# Patient Record
Sex: FEMALE | Employment: FULL TIME | URBAN - METROPOLITAN AREA
[De-identification: names, ages, dates, MRNs, and addresses within clinical notes are randomized per-mention and may not be internally consistent; named-entity substitution may affect disease eponyms.]

---

## 2024-01-06 ENCOUNTER — APPOINTMENT (OUTPATIENT)
Dept: DERMATOLOGY | Facility: CLINIC | Age: 54
End: 2024-01-06
Payer: COMMERCIAL

## 2024-01-12 ENCOUNTER — ANCILLARY PROCEDURE (OUTPATIENT)
Dept: RADIOLOGY | Facility: CLINIC | Age: 54
End: 2024-01-12
Payer: COMMERCIAL

## 2024-01-12 ENCOUNTER — OFFICE VISIT (OUTPATIENT)
Dept: ORTHOPEDIC SURGERY | Facility: CLINIC | Age: 54
End: 2024-01-12
Payer: COMMERCIAL

## 2024-01-12 ENCOUNTER — APPOINTMENT (OUTPATIENT)
Dept: RADIOLOGY | Facility: CLINIC | Age: 54
End: 2024-01-12
Payer: COMMERCIAL

## 2024-01-12 DIAGNOSIS — M25.561 RIGHT KNEE PAIN, UNSPECIFIED CHRONICITY: ICD-10-CM

## 2024-01-12 DIAGNOSIS — M25.551 RIGHT HIP PAIN: ICD-10-CM

## 2024-01-12 PROCEDURE — 73502 X-RAY EXAM HIP UNI 2-3 VIEWS: CPT | Mod: RT

## 2024-01-12 PROCEDURE — 73502 X-RAY EXAM HIP UNI 2-3 VIEWS: CPT | Mod: RIGHT SIDE | Performed by: RADIOLOGY

## 2024-01-20 ENCOUNTER — APPOINTMENT (OUTPATIENT)
Dept: DERMATOLOGY | Facility: CLINIC | Age: 54
End: 2024-01-20
Payer: COMMERCIAL

## 2024-02-13 ENCOUNTER — OFFICE VISIT (OUTPATIENT)
Dept: ORTHOPEDIC SURGERY | Facility: CLINIC | Age: 54
End: 2024-02-13
Payer: COMMERCIAL

## 2024-02-13 DIAGNOSIS — M25.561 RIGHT KNEE PAIN, UNSPECIFIED CHRONICITY: ICD-10-CM

## 2024-02-13 DIAGNOSIS — M16.0 PRIMARY OSTEOARTHRITIS OF BOTH HIPS: Primary | ICD-10-CM

## 2024-02-13 DIAGNOSIS — M25.551 RIGHT HIP PAIN: ICD-10-CM

## 2024-02-13 DIAGNOSIS — M17.0 PRIMARY OSTEOARTHRITIS OF BOTH KNEES: ICD-10-CM

## 2024-02-13 PROCEDURE — 99213 OFFICE O/P EST LOW 20 MIN: CPT | Performed by: FAMILY MEDICINE

## 2024-02-13 PROCEDURE — 1036F TOBACCO NON-USER: CPT | Performed by: FAMILY MEDICINE

## 2024-02-13 RX ORDER — FLUTICASONE PROPIONATE 50 MCG
1 SPRAY, SUSPENSION (ML) NASAL DAILY
COMMUNITY

## 2024-02-13 RX ORDER — KETOTIFEN FUMARATE 0.35 MG/ML
1 SOLUTION/ DROPS OPHTHALMIC 2 TIMES DAILY
COMMUNITY

## 2024-02-13 RX ORDER — LORATADINE 10 MG/1
5 TABLET ORAL DAILY
COMMUNITY

## 2024-02-13 ASSESSMENT — PAIN SCALES - GENERAL: PAINLEVEL_OUTOF10: 9

## 2024-02-13 ASSESSMENT — PAIN DESCRIPTION - DESCRIPTORS: DESCRIPTORS: ACHING;SHARP

## 2024-02-13 ASSESSMENT — PAIN - FUNCTIONAL ASSESSMENT: PAIN_FUNCTIONAL_ASSESSMENT: 0-10

## 2024-02-13 NOTE — PROGRESS NOTES
Vincenzo pt R hip pain    History of Present Illness:  Encounter date: 02/13/2024  Jodie Boateng is a 53 y.o. female who presents today for evaluation of chronic right hip and right knee pain. She has history of DJD in both knees and right hip. She previously followed with [facility] for management of these issues, has had cortisone injections in her knees and right hip in the past, s/p right hip cortisone injection 10/2023 which did provide relief. She states her pain has significantly improved over the last several months with cutting out alcohol and dairy, stating she does not feel she needs any intervention today, but wanted to establish care for further orthopedic management if necessary, no longer able to go to [facility] due to insurance change. She takes ibuprofen or naproxen as needed which is infrequent. She has done PT in the past, but not currently following with PT. She denies any new injury/trauma to knees or right hip. Pain is primarily in right groin when it flares up and states she has started to have occasional similar mild pain in left hip. Denies radiation of pain, numbness, tingling, weakness, swelling, redness or warmth. Denies other concerns.    Review of Systems  Constitutional: no fever, no chills, not feeling tired, no recent weight gain and no recent weight loss.   ENT: no nosebleeds.   Cardiovascular: no chest pain.   Respiratory: no shortness of breath and no cough.   Gastrointestinal: no abdominal pain, no nausea, no diarrhea and no vomiting.   Musculoskeletal: as noted in HPI and no arthralgias.   Integumentary: no rashes and no skin wound.   Neurological: no headache.   Psychiatric: no sleep disturbances and no depression.   Endocrine: no muscle weakness and no muscle cramps.   Hematologic/Lymphatic: no swollen glands and no tendency for easy bruising.     Physical Exam:  The Right hip and pelvis are without obvious signs of acute bony deformity or instability. Active and passive  range of motion are slightly limited in internal rotation and slightly painful in internal rotation, otherwise full and pain free. Log roll is negative. Straight leg raise test is negative. Ced is negative. Crossover is negative. Hip strength is weak as compared to the opposite hip. The opposite hip is otherwise nontender and stable. Gait is non-antalgic and tandem.     The RIGHT knee is without joint effusion. Patella crepitus and grind are positive. There is minimal tenderness to the medial and lateral joint lines. Flexion and extension are without mechanical blocking. There is no instability with stress testing.  The LEFT knee is without joint effusion. Patella crepitus and grind are positive. There is minimal tenderness to the medial and lateral joint lines. Flexion and extension are without mechanical blocking. There is no instability with stress testing.  Skin - no rashes, sores, or open lesions. Strength, sensory and vascular exams are otherwise normal. There is no clubbing, cyanosis or edema.    Constitutional: General appearance = Alert and in no acute distress. Well developed, well nourished.   Head and face: Normal.    Eyes: External Eye, Conjunctiva and Lids=Normal external exam; extraocular movements intact (EOMI).   Ears, Nose, Mouth, and Throat: External inspection of ears and nose=Normal.   Hearing= Normal.    Neck: No neck mass was observed. Supple.   Pulmonary: Respiratory effort = No respiratory distress.   Cardiovascular: Examination of extremities= No peripheral edema.   Skin and subcutaneous tissue: Normal skin color and pigmentation, normal skin turgor, and no rash; palpation of skin and subcutaneous tissue=Normal.    Neurologic: Sensation= Normal.    Psychiatric: Judgment and insight= Intact.  Orientation to person, place, and time: Alert and oriented x 3.  Mood and affect= Normal.     Imaging:  === 01/12/24 ===    XR HIP RIGHT WITH PELVIS WHEN PERFORMED 2 OR 3 VIEWS    - Impression  -  Moderate right hip osteoarthritis      MACRO:  None    Signed by: Rich Lee 1/13/2024 9:40 AM  Dictation workstation:   [id number]     Radiographs of the right hip obtained from outside source on 1/12/2024 were reviewed and revealed moderate right hip DJD, mild left hip DJD.  The studies were reviewed with Dr. Galan personally in the office today.    Problem List Items Addressed This Visit    None  Visit Diagnoses         Codes    Primary osteoarthritis of both hips    -  Primary M16.0    Right hip pain     M25.551    Right knee pain, unspecified chronicity     M25.561    Primary osteoarthritis of both knees     M17.0          Patient Discussion/Summary  We reviewed the exam and x-ray findings and discussed the conservative and surgical treatment options. We agreed to continue with current conservative management. Recommended prescription doses of Tylenol and Voltaren gel as needed for pain, but may take ibuprofen or naproxen as needed for breakthrough pain. She should continue home exercises regularly and avoid painful activity. She may return as needed if her pain returns/worsens or any new concerns arise.     **This note was dictated using Dragon speech recognition software and was not corrected for spelling or grammatical errors**     Sigifredo Galan M.D.  Clinical , Division of Sports Medicine  Primary Care Sports Medicine  Department of Orthopedic Surgery  [facility]

## 2024-02-29 ENCOUNTER — OFFICE VISIT (OUTPATIENT)
Dept: PRIMARY CARE | Facility: CLINIC | Age: 54
End: 2024-02-29
Payer: COMMERCIAL

## 2024-02-29 VITALS
HEIGHT: 67 IN | DIASTOLIC BLOOD PRESSURE: 71 MMHG | BODY MASS INDEX: 25.43 KG/M2 | HEART RATE: 73 BPM | OXYGEN SATURATION: 94 % | SYSTOLIC BLOOD PRESSURE: 103 MMHG | WEIGHT: 162 LBS

## 2024-02-29 DIAGNOSIS — R87.619 ABNORMAL CERVICAL PAPANICOLAOU SMEAR, UNSPECIFIED ABNORMAL PAP FINDING: Primary | ICD-10-CM

## 2024-02-29 DIAGNOSIS — M25.561 CHRONIC PAIN OF RIGHT KNEE: ICD-10-CM

## 2024-02-29 DIAGNOSIS — G89.29 CHRONIC PAIN OF RIGHT KNEE: ICD-10-CM

## 2024-02-29 DIAGNOSIS — F40.232 FEAR OF OTHER MEDICAL CARE: ICD-10-CM

## 2024-02-29 DIAGNOSIS — B97.7 HPV IN FEMALE: ICD-10-CM

## 2024-02-29 PROBLEM — E74.10 FRUCTOSE MALABSORPTION: Status: ACTIVE | Noted: 2024-02-29

## 2024-02-29 PROBLEM — M25.551 PAIN IN RIGHT HIP: Status: ACTIVE | Noted: 2020-11-02

## 2024-02-29 PROBLEM — J30.1 NON-SEASONAL ALLERGIC RHINITIS DUE TO POLLEN: Status: ACTIVE | Noted: 2024-02-29

## 2024-02-29 PROBLEM — R29.898 WEAKNESS OF RIGHT HIP: Status: ACTIVE | Noted: 2019-04-08

## 2024-02-29 PROCEDURE — 99204 OFFICE O/P NEW MOD 45 MIN: CPT | Performed by: FAMILY MEDICINE

## 2024-02-29 ASSESSMENT — PATIENT HEALTH QUESTIONNAIRE - PHQ9
2. FEELING DOWN, DEPRESSED OR HOPELESS: NOT AT ALL
1. LITTLE INTEREST OR PLEASURE IN DOING THINGS: NOT AT ALL
SUM OF ALL RESPONSES TO PHQ9 QUESTIONS 1 AND 2: 0

## 2024-02-29 NOTE — PROGRESS NOTES
"  Subjective   Patient ID: Jodie Boateng is a 53 y.o. female who presents for Annual Exam (Patient is here for annual physical and to establish care. She would also like too discuss coming back to have pap done. ).    She sees Ortho regularly for knee and hip pain, sometimes an injection.  She eliminated cheese and most dairy last year and her pain has bene much better.      Past Medical, Surgical, Social and Family Hx reviewed-Yes    Any acute complaints?    Initially she only talks about her knee and hip pain, but throughout the history she does mention a lot of other things that \"bother her.\"      Any chronic issues addressed today or Rx refills needed?    No RF needed    Last pap about 2 years ago, normal but HPV positive.  She was scheduled for a colposcopy and did not go for it.         Conversation about the needed colposcopy quickly devolves into confusion.  At first she says she likely does not need the colpo because she ate the \"right kind of mushrooms\" and she thinks her HPV is probably gone.  When I try to explain why she still needs to have the colposcopy to prevent cervical cancer, she talks about how she refuses to do it without being \"put to sleep to avoid the pain.\"  All of my attempts to reassure her that the pain of a colposcopy is not that serious for most people and can be alleviated or handled with advil, she laughs and says that it just crazy.      Last Mammogram never, and does not want one at all.  She says \"I will agree to a mammogram when I can have anesthesia for it, just like when a man has a scrotal ultrasound.\"  I tried to explain that a man would not be given any anesth at all for a scrotal US, unless perhaps a biopsy of some sort was involved, she said that she has read about it, and she knows that it is true.      Colon CA screening Hx just a stool card 2 years ago  Labs reviewed 2020 and 2021, ordered  Up to date on immunizations needs shingrix  Dentist in the past year " "yes    Menstruation -never got to this  Sexual Activity -never got to this        PE:  /71   Pulse 73   Ht 1.707 m (5' 7.2\")   Wt 73.5 kg (162 lb)   LMP  (LMP Unknown)   SpO2 94%   BMI 25.22 kg/m²   Alert adult woman, NAD  Affect pleasant and appropriate at first, memory WNL, but judgement is certainly clouded by some medical misperceptions and some anger at the \"injustice\" (her word) of how women are not treated fairly by the medical profession.          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  I tied multiple times to let things go when she laughed at my opinion and basically told me that I did not know what I was talking about.  Eventually I did not think that it was a good idea for me to be her doctor.  I then told her that I could not be her doctor as she clearly thinks she knows more than me about various medical issues and every patient deserves a doctor that knows more than they do.  She agreed and I explained that we would not be caring for her in the future but I urged her to find a new PCP asap and go back to see her GYN asap also.    Time in office with heladio 35 minutes, documentation time 10 minutes       "

## 2024-03-18 ENCOUNTER — APPOINTMENT (OUTPATIENT)
Dept: PRIMARY CARE | Facility: CLINIC | Age: 54
End: 2024-03-18
Payer: COMMERCIAL

## 2024-03-27 DIAGNOSIS — M25.561 RIGHT KNEE PAIN, UNSPECIFIED CHRONICITY: ICD-10-CM

## 2024-03-28 ENCOUNTER — APPOINTMENT (OUTPATIENT)
Dept: ORTHOPEDIC SURGERY | Facility: CLINIC | Age: 54
End: 2024-03-28
Payer: COMMERCIAL

## 2024-04-11 ENCOUNTER — HOSPITAL ENCOUNTER (OUTPATIENT)
Dept: RADIOLOGY | Facility: CLINIC | Age: 54
Discharge: HOME | End: 2024-04-11
Payer: COMMERCIAL

## 2024-04-11 ENCOUNTER — OFFICE VISIT (OUTPATIENT)
Dept: ORTHOPEDIC SURGERY | Facility: CLINIC | Age: 54
End: 2024-04-11
Payer: COMMERCIAL

## 2024-04-11 ENCOUNTER — APPOINTMENT (OUTPATIENT)
Dept: ORTHOPEDIC SURGERY | Facility: CLINIC | Age: 54
End: 2024-04-11
Payer: COMMERCIAL

## 2024-04-11 ENCOUNTER — HOSPITAL ENCOUNTER (OUTPATIENT)
Dept: RADIOLOGY | Facility: EXTERNAL LOCATION | Age: 54
Discharge: HOME | End: 2024-04-11

## 2024-04-11 DIAGNOSIS — M25.561 RIGHT KNEE PAIN, UNSPECIFIED CHRONICITY: ICD-10-CM

## 2024-04-11 DIAGNOSIS — M17.11 PRIMARY OSTEOARTHRITIS OF RIGHT KNEE: Primary | ICD-10-CM

## 2024-04-11 PROCEDURE — 99213 OFFICE O/P EST LOW 20 MIN: CPT | Performed by: FAMILY MEDICINE

## 2024-04-11 PROCEDURE — 20611 DRAIN/INJ JOINT/BURSA W/US: CPT | Performed by: FAMILY MEDICINE

## 2024-04-11 PROCEDURE — 73560 X-RAY EXAM OF KNEE 1 OR 2: CPT | Mod: RT

## 2024-04-11 PROCEDURE — 73560 X-RAY EXAM OF KNEE 1 OR 2: CPT | Mod: RIGHT SIDE | Performed by: RADIOLOGY

## 2024-04-11 RX ADMIN — LIDOCAINE HYDROCHLORIDE 2 ML: 10 INJECTION, SOLUTION EPIDURAL; INFILTRATION; INTRACAUDAL; PERINEURAL at 14:00

## 2024-04-11 RX ADMIN — METHYLPREDNISOLONE ACETATE 80 MG: 40 INJECTION, SUSPENSION INTRA-ARTICULAR; INTRALESIONAL; INTRAMUSCULAR; SOFT TISSUE at 14:00

## 2024-04-11 ASSESSMENT — PAIN - FUNCTIONAL ASSESSMENT: PAIN_FUNCTIONAL_ASSESSMENT: 0-10

## 2024-04-11 ASSESSMENT — PAIN DESCRIPTION - DESCRIPTORS: DESCRIPTORS: TIGHTNESS;OTHER (COMMENT)

## 2024-04-11 ASSESSMENT — PAIN SCALES - GENERAL: PAINLEVEL_OUTOF10: 7

## 2024-04-11 NOTE — PROGRESS NOTES
Est New prob R knee pain    Subjective    Patient ID: Jodie Boateng is a 53 y.o. female.    Chief Complaint: Pain of the Right Knee  Jodie is a very pleasant 53-year-old female who presents with a new complaint of right knee pain.  She has known osteoarthritis and has been receiving steroid injections at a number of different places.  Her most recent injection was done mid September 2023, approximately 6 or 7 months ago.  She got good relief up until recently.  She denies any new injury or trauma to the knee.  She states there is some tightness with some limited range of motion.  She rates her pain as a 7 out of 10 at its worst.  She has tried Tiger balm, herbal rub, ibuprofen, and ice without any significant improvement.    Objective   MSK: The RIGHT knee is without joint effusion. Patella crepitus and grind are positive. There is minimal tenderness to the medial and lateral joint lines. Flexion and extension are without mechanical blocking. There is no instability with stress testing.  The LEFT knee is without joint effusion. Patella crepitus and grind are positive. There is minimal tenderness to the medial and lateral joint lines. Flexion and extension are without mechanical blocking. There is no instability with stress testing.  Skin - no rashes, sores, or open lesions. Strength, sensory and vascular exams are otherwise normal. There is no clubbing, cyanosis or edema.    Image Results:  XR knee right 1-2 views  Narrative: Interpreted By:  Rich Lee,   STUDY:  XR KNEE RIGHT 1-2 VIEWS; ;  4/11/2024 2:01 pm      INDICATION:  Signs/Symptoms:pain.      COMPARISON:  None.      ACCESSION NUMBER(S):  [id number]      ORDERING CLINICIAN:  LES TALAMANTES      FINDINGS:  Right knee, two views      Mild osteophytosis in the lateral compartment. Intra-articular body  posteriorly. Otherwise no significant degenerative change seen. There  is a small effusion in the suprapatellar recess. There is no fracture  or  dislocation      Impression: Mild lateral compartment degenerative changes. Intra-articular  ossific body posteriorly. No acute abnormality.          MACRO:  None      Signed by: Rich Lee 4/12/2024 6:36 PM  Dictation workstation:   [id number]    L Inj/Asp: R knee on 4/11/2024 2:00 PM  Indications: pain  Details: 22 G needle, ultrasound-guided superolateral approach  Medications: 80 mg methylPREDNISolone acetate 40 mg/mL; 2 mL lidocaine PF 10 mg/mL (1 %)  Outcome: tolerated well, no immediate complications    Ultrasound-guided right knee steroid injection with DepoMedrol.  Risks, benefits, and alternatives were explained to the patient and verbal and written consent was obtained.  The patient was placed in supine position with a bump under the knees for comfort.  The right knee was identified.  A superior lateral patellar approach was used the linear ultrasound transducer was placed over the superior aspect of the knee and there was effusion identified.  The area of injection was cleaned with a Betadine swab.The patient tolerated the procedure well and there were no complications or blood loss.  Ultrasound images were obtained throughout the procedure and stored for review at a later time if needed.    Consent was given by the patient. Immediately prior to procedure a time out was called to verify the correct patient, procedure, equipment, support staff and site/side marked as required. Patient was prepped and draped in the usual sterile fashion.           Assessment/Plan   Encounter Diagnoses:  Primary osteoarthritis of right knee    Orders Placed This Encounter    L Inj/Asp    Point of Care Ultrasound       Status post ultrasound-guided right knee steroid njection with Depo Medrol.  she was educated on the signs and symptoms of local reaction and infection and should call the office if she experiences any of these. she should take it easy on the knee for the next 24 to 48 hours, rest, ice, and  anti-inflammatories. After that, she can return to her normal activity.  she understands that this is not a cure, but an attempt to buy some time, decrease her discomfort, and slow the arthritic process. she may ultimately require surgery, but we will exhaust all of our conservative treatment options prior to that. she will follow-up as needed. All of her questions were answered and she agrees with the treatment plan.    ** Please excuse any errors in grammar or translation related to this dictation. Voice recognition software was utilized to prepare this document. **    Sigifredo Galan M.D.  Clinical , Division of Sports Medicine  Primary Care Sports Medicine  Department of Orthopedic Surgery  [facility]

## 2024-04-18 PROBLEM — M17.11 PRIMARY OSTEOARTHRITIS OF RIGHT KNEE: Status: ACTIVE | Noted: 2024-04-18

## 2024-04-18 RX ORDER — LIDOCAINE HYDROCHLORIDE 10 MG/ML
2 INJECTION, SOLUTION EPIDURAL; INFILTRATION; INTRACAUDAL; PERINEURAL
Status: COMPLETED | OUTPATIENT
Start: 2024-04-11 | End: 2024-04-11

## 2024-04-18 RX ORDER — METHYLPREDNISOLONE ACETATE 40 MG/ML
80 INJECTION, SUSPENSION INTRA-ARTICULAR; INTRALESIONAL; INTRAMUSCULAR; SOFT TISSUE
Status: COMPLETED | OUTPATIENT
Start: 2024-04-11 | End: 2024-04-11

## 2024-05-17 ENCOUNTER — APPOINTMENT (OUTPATIENT)
Dept: DERMATOLOGY | Facility: CLINIC | Age: 54
End: 2024-05-17
Payer: COMMERCIAL